# Patient Record
Sex: FEMALE | Race: WHITE
[De-identification: names, ages, dates, MRNs, and addresses within clinical notes are randomized per-mention and may not be internally consistent; named-entity substitution may affect disease eponyms.]

---

## 2018-09-06 ENCOUNTER — HOSPITAL ENCOUNTER (INPATIENT)
Dept: HOSPITAL 46 - FBC | Age: 28
LOS: 3 days | Discharge: HOME | End: 2018-09-09
Attending: OBSTETRICS & GYNECOLOGY | Admitting: OBSTETRICS & GYNECOLOGY
Payer: COMMERCIAL

## 2018-09-06 VITALS — BODY MASS INDEX: 34.63 KG/M2 | HEIGHT: 64.02 IN | WEIGHT: 202.83 LBS

## 2018-09-06 DIAGNOSIS — Z3A.38: ICD-10-CM

## 2018-09-06 DIAGNOSIS — Z88.5: ICD-10-CM

## 2018-09-06 DIAGNOSIS — O32.2XX1: ICD-10-CM

## 2018-09-06 DIAGNOSIS — O30.043: Primary | ICD-10-CM

## 2018-09-06 DIAGNOSIS — O32.2XX2: ICD-10-CM

## 2018-09-07 PROCEDURE — 3E0R3BZ INTRODUCTION OF ANESTHETIC AGENT INTO SPINAL CANAL, PERCUTANEOUS APPROACH: ICD-10-PCS

## 2018-09-07 PROCEDURE — 00HU33Z INSERTION OF INFUSION DEVICE INTO SPINAL CANAL, PERCUTANEOUS APPROACH: ICD-10-PCS

## 2018-09-07 PROCEDURE — 10907ZC DRAINAGE OF AMNIOTIC FLUID, THERAPEUTIC FROM PRODUCTS OF CONCEPTION, VIA NATURAL OR ARTIFICIAL OPENING: ICD-10-PCS | Performed by: OBSTETRICS & GYNECOLOGY

## 2018-09-07 PROCEDURE — 3E0P7VZ INTRODUCTION OF HORMONE INTO FEMALE REPRODUCTIVE, VIA NATURAL OR ARTIFICIAL OPENING: ICD-10-PCS | Performed by: OBSTETRICS & GYNECOLOGY

## 2018-09-08 NOTE — PR
[facility]
                                    [address]
                                  [place], [place]  [address]
_________________________________________________________________________________________
                                                                 Signed   
 
 
===================================
PP Progress Notes
===================================
Datetime Report Generated by CPN: 2018 08:33
   
SUBJECTIVE:  [id number]
Pain:  Within normal limits
Nausea/Vomiting:  Denies
Vital Signs:  [id number]
Vital Signs:  Reviewed; Within Normal Limits
POSTPARTUM EXAM:  [id number]
Cardiovascular:  Not Done
Respiratory:  Not Done
Abdomen/Uterus:  Abnormal
Lochia:  Normal
Vulva/Perineum:  Not Done
Breasts:  Not Done
CVA Tenderness:  Not Done
Extremities:  Normal
 Incision:  Not Applicable
Breastfeeding Progress:  Normal
Exam Comments:  Fundus firm, NT @ U.
      
   H/H 11.6/35.3, WBC 17.6, plat 243k
IMPRESSION/PLAN/PROCEDURES:  [id number]
Impression:  Normal postpartum progression
Plan:  Continue present management
Progress Notes:  Doing well.  Will continue routine care.
Signing Physician:  Radhika De Souza MD
 
 
Copies:                                
~
 
 
 
 
 
 
 
 
 
 
*Electronically Signed*  18  0833  RADHIKA DE SOUZA MD            
                                                                       
_________________________________________________________________________________________
PATIENT NAME:     ADDISON GOODSON                    
MEDICAL RECORD #: [id number]                     PROGRESS NOTE                 
          ACCT #: [id number]  
DATE OF BIRTH:   [birth year]                                       
PHYSICIAN:   RADHIKA DE SOUZA MD                   RPT #: [id number]
REPORT IS CONFIDENTIAL AND NOT TO BE RELEASED WITHOUT AUTHORIZATION

## 2018-09-09 NOTE — PR
[facility]
                                    [address]
                                  [place]  [address]
_________________________________________________________________________________________
                                                                 Signed   
 
 
===================================
PP Progress Notes
===================================
Datetime Report Generated by CPN: 2018 09:58
   
SUBJECTIVE:  [id number]
Pain:  Within normal limits
Nausea/Vomiting:  Denies
Vital Signs:  [id number]
Vital Signs:  Reviewed; Within Normal Limits
POSTPARTUM EXAM:  [id number]
Cardiovascular:  Not Done
Respiratory:  Not Done
Abdomen/Uterus:  Abnormal
Lochia:  Normal
Vulva/Perineum:  Not Done
Breasts:  Not Done
CVA Tenderness:  Not Done
Extremities:  Normal
 Incision:  Not Applicable
Breastfeeding Progress:  Normal
Exam Comments:  Fundus firm, NT @ U.
IMPRESSION/PLAN/PROCEDURES:  [id number]
Impression:  Normal postpartum progression
Plan:  Discharge
Procedures:  None
Progress Notes:  Doing well.  She is ready for D/C.
Signing Physician:  Radhika De Souza MD
 
 
Copies:                                
~
 
 
 
 
 
 
 
 
 
 
 
*Electronically Signed*  18  0958  RADHIKA DE SOUZA MD            
                                                                       
_________________________________________________________________________________________
PATIENT NAME:     ADDISON GOODSON                    
MEDICAL RECORD #: [id number]                     PROGRESS NOTE                 
          ACCT #: [id number]  
DATE OF BIRTH:   [birth year]                                       
PHYSICIAN:   RADHIKA DE SOUZA MD                   RPT #: [id number]
REPORT IS CONFIDENTIAL AND NOT TO BE RELEASED WITHOUT AUTHORIZATION

## 2019-04-03 ENCOUNTER — HOSPITAL ENCOUNTER (EMERGENCY)
Dept: HOSPITAL 46 - ED | Age: 29
Discharge: HOME | End: 2019-04-03
Payer: COMMERCIAL

## 2019-04-03 VITALS — BODY MASS INDEX: 34.49 KG/M2 | WEIGHT: 202.01 LBS | HEIGHT: 64 IN

## 2019-04-03 DIAGNOSIS — R10.11: Primary | ICD-10-CM

## 2019-04-03 DIAGNOSIS — J02.0: ICD-10-CM

## 2019-04-03 DIAGNOSIS — Z88.5: ICD-10-CM
